# Patient Record
Sex: MALE | URBAN - METROPOLITAN AREA
[De-identification: names, ages, dates, MRNs, and addresses within clinical notes are randomized per-mention and may not be internally consistent; named-entity substitution may affect disease eponyms.]

---

## 2021-12-16 ENCOUNTER — APPOINTMENT (RX ONLY)
Dept: URBAN - METROPOLITAN AREA CLINIC 303 | Facility: CLINIC | Age: 41
Setting detail: DERMATOLOGY
End: 2021-12-16

## 2021-12-16 DIAGNOSIS — B35.1 TINEA UNGUIUM: ICD-10-CM

## 2021-12-16 DIAGNOSIS — L82.1 OTHER SEBORRHEIC KERATOSIS: ICD-10-CM

## 2021-12-16 DIAGNOSIS — L65.9 NONSCARRING HAIR LOSS, UNSPECIFIED: ICD-10-CM

## 2021-12-16 DIAGNOSIS — L85.3 XEROSIS CUTIS: ICD-10-CM

## 2021-12-16 PROCEDURE — ? PRESCRIPTION MEDICATION MANAGEMENT

## 2021-12-16 PROCEDURE — ? FULL BODY SKIN EXAM - DECLINED

## 2021-12-16 PROCEDURE — ? TREATMENT REGIMEN

## 2021-12-16 PROCEDURE — ? SCREENING FOR COVID-19

## 2021-12-16 PROCEDURE — ? PRESCRIPTION

## 2021-12-16 PROCEDURE — 99204 OFFICE O/P NEW MOD 45 MIN: CPT

## 2021-12-16 PROCEDURE — ? COUNSELING

## 2021-12-16 RX ORDER — FINASTERIDE 1 MG/1
TABLET, FILM COATED ORAL
Qty: 90 | Refills: 3 | Status: ERX | COMMUNITY
Start: 2021-12-16

## 2021-12-16 RX ADMIN — FINASTERIDE: 1 TABLET, FILM COATED ORAL at 00:00

## 2021-12-16 ASSESSMENT — LOCATION DETAILED DESCRIPTION DERM
LOCATION DETAILED: LEFT MEDIAL FRONTAL SCALP
LOCATION DETAILED: RIGHT FOREHEAD
LOCATION DETAILED: LEFT ULNAR DORSAL HAND
LOCATION DETAILED: RIGHT DORSAL GREAT TOE
LOCATION DETAILED: RIGHT RADIAL DORSAL HAND

## 2021-12-16 ASSESSMENT — LOCATION SIMPLE DESCRIPTION DERM
LOCATION SIMPLE: RIGHT HAND
LOCATION SIMPLE: RIGHT GREAT TOE
LOCATION SIMPLE: LEFT HAND
LOCATION SIMPLE: LEFT SCALP
LOCATION SIMPLE: RIGHT FOREHEAD

## 2021-12-16 ASSESSMENT — LOCATION ZONE DERM
LOCATION ZONE: FACE
LOCATION ZONE: HAND
LOCATION ZONE: TOE
LOCATION ZONE: SCALP

## 2021-12-16 NOTE — PROCEDURE: MIPS QUALITY
Pt would like Dr Gonsalves to prescribe another muscle relaxer, not the one that Dr Yates prescribed, Baclofen 10 mg tab. Baclofen was too strong for him.  Pt would like one that lasts 4-8 hours, if possible, not make him groggy.       Please advise  
Detail Level: Detailed
Quality 110: Preventive Care And Screening: Influenza Immunization: Influenza Immunization Administered during Influenza season
Quality 226: Preventive Care And Screening: Tobacco Use: Screening And Cessation Intervention: Patient screened for tobacco use and is an ex/non-smoker
Quality 130: Documentation Of Current Medications In The Medical Record: Current Medications Documented
Quality 431: Preventive Care And Screening: Unhealthy Alcohol Use - Screening: Patient not identified as an unhealthy alcohol user when screened for unhealthy alcohol use using a systematic screening method

## 2021-12-16 NOTE — HPI: EVALUATION OF SKIN LESION(S)
What Type Of Note Output Would You Prefer (Optional)?: Standard Output
Hpi Title: Evaluation of Skin Lesions
How Severe Are Your Spot(S)?: mild
Have Your Spot(S) Been Treated In The Past?: has not been treated
Additional History: Right temple, toenail fungus and refill for finasteride (hairloss)

## 2021-12-16 NOTE — PROCEDURE: TREATMENT REGIMEN
Initiate Treatment: Amlactin twice daily as needed, Aquaphor nightly with cotton gloves on
Detail Level: Zone

## 2021-12-16 NOTE — PROCEDURE: SCREENING FOR COVID-19
Has The Patient Received Both Doses Of The Vaccine (If Applicable)?: Yes
Previous Infection Text: The patient has recovered from a previous COVID-19 infection.
Which Covid 19 Vaccine Did Patient Receive (Optional)?: Pfizer
Has The Patient Been Infected With Covid-19 In The Past?: No
Detail Level: Simple

## 2021-12-16 NOTE — PROCEDURE: PRESCRIPTION MEDICATION MANAGEMENT
Detail Level: Zone
Plan: Discussed cicloporox sol and listerine soaks, pt declined today will prescribe in the future if needed
Render In Strict Bullet Format?: No